# Patient Record
Sex: MALE
[De-identification: names, ages, dates, MRNs, and addresses within clinical notes are randomized per-mention and may not be internally consistent; named-entity substitution may affect disease eponyms.]

---
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
## 2022-01-28 ENCOUNTER — HOSPITAL ENCOUNTER (EMERGENCY)
Dept: HOSPITAL 92 - ERS | Age: 22
Discharge: HOME | End: 2022-01-28
Payer: SELF-PAY

## 2022-01-28 DIAGNOSIS — F17.210: ICD-10-CM

## 2022-01-28 DIAGNOSIS — H66.92: Primary | ICD-10-CM

## 2022-01-28 DIAGNOSIS — H72.92: ICD-10-CM

## 2022-01-28 PROCEDURE — 99282 EMERGENCY DEPT VISIT SF MDM: CPT

## 2022-05-31 ENCOUNTER — HOSPITAL ENCOUNTER (EMERGENCY)
Dept: HOSPITAL 92 - ERS | Age: 22
Discharge: HOME | End: 2022-05-31
Payer: SELF-PAY

## 2022-05-31 DIAGNOSIS — T63.441A: Primary | ICD-10-CM

## 2022-05-31 DIAGNOSIS — F17.210: ICD-10-CM

## 2022-05-31 PROCEDURE — 99282 EMERGENCY DEPT VISIT SF MDM: CPT
